# Patient Record
Sex: FEMALE | URBAN - METROPOLITAN AREA
[De-identification: names, ages, dates, MRNs, and addresses within clinical notes are randomized per-mention and may not be internally consistent; named-entity substitution may affect disease eponyms.]

---

## 2021-12-05 ENCOUNTER — EMERGENCY (EMERGENCY)
Facility: HOSPITAL | Age: 21
LOS: 1 days | Discharge: HOME | End: 2021-12-05
Attending: EMERGENCY MEDICINE | Admitting: EMERGENCY MEDICINE
Payer: COMMERCIAL

## 2021-12-05 VITALS
OXYGEN SATURATION: 99 % | RESPIRATION RATE: 15 BRPM | TEMPERATURE: 98 F | DIASTOLIC BLOOD PRESSURE: 59 MMHG | HEIGHT: 67 IN | WEIGHT: 176.37 LBS | SYSTOLIC BLOOD PRESSURE: 93 MMHG | HEART RATE: 97 BPM

## 2021-12-05 DIAGNOSIS — F12.929 CANNABIS USE, UNSPECIFIED WITH INTOXICATION, UNSPECIFIED: ICD-10-CM

## 2021-12-05 DIAGNOSIS — R42 DIZZINESS AND GIDDINESS: ICD-10-CM

## 2021-12-05 PROCEDURE — 99284 EMERGENCY DEPT VISIT MOD MDM: CPT

## 2021-12-05 PROCEDURE — 93010 ELECTROCARDIOGRAM REPORT: CPT | Mod: NC

## 2021-12-05 NOTE — ED PROVIDER NOTE - PATIENT PORTAL LINK FT
You can access the FollowMyHealth Patient Portal offered by [facility] by registering at the following website: [email or web address]. By joining [employer]’s FollowMyHealth portal, you will also be able to view your health information using other applications (apps) compatible with our system.

## 2021-12-05 NOTE — ED PROVIDER NOTE - CLINICAL SUMMARY MEDICAL DECISION MAKING FREE TEXT BOX
20 y/o F presents to the ED for lightheadedness with syncopal episode. On exam, Pt appears well and in no acute distress. Plan for EKG. Will reassess afterwards.

## 2021-12-05 NOTE — ED PROVIDER NOTE - OBJECTIVE STATEMENT
20 y/o F with no PMH presents to the ED via EMS for dizziness and syncope. Pt reports she took a 300mg chocolate edible earlier today followed by one drink. Pt ate some lunch afterwards and began to develop nausea. Pt also began feeling near syncopal when attempting to go use the bathroom. She laid down and got back up shortly afterwards but felt near syncopal again with ongoing nausea. Pt got up again to use the bathroom but felt lightheaded and had a syncopal episode. Pt states she did not hit her head and was able to hear others around her talking. Pt denies fevers, chills, CP, SOB, vomiting, or blurred vision.

## 2021-12-05 NOTE — ED ADULT TRIAGE NOTE - CHIEF COMPLAINT QUOTE
patient BIBA from restaurant; took THC gummy from truck (300mg) 1 hour ago and now feeling nausea and dizziness; felt week and was placed on the floor by bystanders; did not hit head on ground; hypotensive in triage 93/59